# Patient Record
Sex: MALE | Race: BLACK OR AFRICAN AMERICAN | NOT HISPANIC OR LATINO | Employment: FULL TIME | URBAN - METROPOLITAN AREA
[De-identification: names, ages, dates, MRNs, and addresses within clinical notes are randomized per-mention and may not be internally consistent; named-entity substitution may affect disease eponyms.]

---

## 2020-07-07 ENCOUNTER — OFFICE VISIT (OUTPATIENT)
Dept: INTERNAL MEDICINE | Facility: CLINIC | Age: 45
End: 2020-07-07
Payer: COMMERCIAL

## 2020-07-07 ENCOUNTER — HOSPITAL ENCOUNTER (OUTPATIENT)
Dept: RADIOLOGY | Facility: HOSPITAL | Age: 45
Discharge: HOME | End: 2020-07-07
Attending: FAMILY MEDICINE
Payer: COMMERCIAL

## 2020-07-07 VITALS
HEIGHT: 71 IN | WEIGHT: 209.88 LBS | RESPIRATION RATE: 18 BRPM | BODY MASS INDEX: 29.38 KG/M2 | SYSTOLIC BLOOD PRESSURE: 116 MMHG | DIASTOLIC BLOOD PRESSURE: 80 MMHG | TEMPERATURE: 98 F | OXYGEN SATURATION: 98 % | HEART RATE: 78 BPM

## 2020-07-07 DIAGNOSIS — M25.551 ACUTE RIGHT HIP PAIN: Primary | ICD-10-CM

## 2020-07-07 DIAGNOSIS — M25.551 ACUTE RIGHT HIP PAIN: ICD-10-CM

## 2020-07-07 DIAGNOSIS — Z29.9 PREVENTIVE MEASURE: ICD-10-CM

## 2020-07-07 PROCEDURE — 99203 OFFICE O/P NEW LOW 30 MIN: CPT | Mod: S$GLB,,, | Performed by: FAMILY MEDICINE

## 2020-07-07 PROCEDURE — 99999 PR PBB SHADOW E&M-NEW PATIENT-LVL IV: CPT | Mod: PBBFAC,,, | Performed by: FAMILY MEDICINE

## 2020-07-07 PROCEDURE — 99999 PR PBB SHADOW E&M-NEW PATIENT-LVL IV: ICD-10-PCS | Mod: PBBFAC,,, | Performed by: FAMILY MEDICINE

## 2020-07-07 PROCEDURE — 99203 PR OFFICE/OUTPT VISIT, NEW, LEVL III, 30-44 MIN: ICD-10-PCS | Mod: S$GLB,,, | Performed by: FAMILY MEDICINE

## 2020-07-07 PROCEDURE — 73502 X-RAY EXAM HIP UNI 2-3 VIEWS: CPT | Mod: 26,RT,, | Performed by: RADIOLOGY

## 2020-07-07 PROCEDURE — 73502 XR HIP 2 VIEW RIGHT: ICD-10-PCS | Mod: 26,RT,, | Performed by: RADIOLOGY

## 2020-07-07 PROCEDURE — 73502 X-RAY EXAM HIP UNI 2-3 VIEWS: CPT | Mod: TC,RT

## 2020-07-07 RX ORDER — METHYLPREDNISOLONE 4 MG/1
TABLET ORAL
Qty: 1 PACKAGE | Refills: 0 | Status: SHIPPED | OUTPATIENT
Start: 2020-07-07 | End: 2020-08-11

## 2020-07-07 NOTE — PROGRESS NOTES
"Subjective:       Patient ID: Dedrick Brooks is a 45 y.o. male.    Chief Complaint: Hip Pain (Right hip started approx  2-3 days ago)    45-year-old  male patient new to my clinic with no significant past medical history reports that patient started to have right hip pain up to 6/10 for the past 2-3 days,  started to progressively get worse, denies any significant tingling or numbness sensation to extremities or low back pain  Denies any trouble with bowel movements or urine  No history of falls or injury reported  Patient has tried taking anti inflammatory 600 mg with some relief    Review of Systems   Constitutional: Negative for appetite change and fatigue.   Eyes: Negative for visual disturbance.   Respiratory: Negative for shortness of breath.    Cardiovascular: Negative for chest pain, palpitations and leg swelling.   Gastrointestinal: Negative for abdominal pain, nausea and vomiting.   Musculoskeletal: Positive for arthralgias. Negative for myalgias.   Skin: Negative for rash.   Neurological: Negative for weakness, numbness and headaches.   Psychiatric/Behavioral: Negative for sleep disturbance.         /80 (BP Location: Right arm, Patient Position: Sitting, BP Method: Large (Manual))   Pulse 78   Temp 97.9 °F (36.6 °C) (Tympanic)   Resp 18   Ht 5' 11" (1.803 m)   Wt 95.2 kg (209 lb 14.1 oz)   SpO2 98%   BMI 29.27 kg/m²   Objective:      Physical Exam  Constitutional:       Appearance: He is well-developed.   HENT:      Head: Normocephalic and atraumatic.   Cardiovascular:      Rate and Rhythm: Normal rate and regular rhythm.      Heart sounds: Normal heart sounds. No murmur.   Pulmonary:      Effort: Pulmonary effort is normal.      Breath sounds: Normal breath sounds. No wheezing.   Abdominal:      General: Bowel sounds are normal.      Palpations: Abdomen is soft.      Tenderness: There is no abdominal tenderness.   Musculoskeletal:         General: Tenderness present. No " swelling, deformity or signs of injury.      Right lower leg: No edema.      Left lower leg: No edema.      Comments: Positive for tenderness to the right hip with straight leg raise test positive.   No significant tenderness noted to the paraspinal lumbar muscles on palpation   Skin:     General: Skin is warm and dry.      Findings: No erythema or rash.   Neurological:      Mental Status: He is alert and oriented to person, place, and time.           Assessment/Plan:   1. Acute right hip pain  - X-Ray Hip 2 View Right; Future  - methylPREDNISolone (MEDROL DOSEPACK) 4 mg tablet; use as directed  Dispense: 1 Package; Refill: 0  Medrol Dosepak prescribed today and patient was advised to take over-the-counter Tylenol/ibuprofen as needed for pain  Graded exercise regimen recommended  Will get x-ray of the right hip to look into further etiology    2. Preventive measure  - Comprehensive metabolic panel; Future  - CBC auto differential; Future  - Lipid Panel; Future  - TSH; Future  - Urinalysis; Future  - HIV 1/2 Ag/Ab (4th Gen); Future  - Vitamin D; Future  Patient was advised to do labs prior to next visit in 1 month for physicals

## 2020-08-07 ENCOUNTER — LAB VISIT (OUTPATIENT)
Dept: LAB | Facility: HOSPITAL | Age: 45
End: 2020-08-07
Attending: FAMILY MEDICINE
Payer: COMMERCIAL

## 2020-08-07 DIAGNOSIS — Z29.9 PREVENTIVE MEASURE: ICD-10-CM

## 2020-08-07 LAB
BILIRUB UR QL STRIP: NEGATIVE
CLARITY UR: CLEAR
COLOR UR: YELLOW
GLUCOSE UR QL STRIP: NEGATIVE
HGB UR QL STRIP: NEGATIVE
KETONES UR QL STRIP: NEGATIVE
LEUKOCYTE ESTERASE UR QL STRIP: NEGATIVE
NITRITE UR QL STRIP: NEGATIVE
PH UR STRIP: 6 [PH] (ref 5–8)
PROT UR QL STRIP: NEGATIVE
SP GR UR STRIP: 1.01 (ref 1–1.03)
URN SPEC COLLECT METH UR: NORMAL

## 2020-08-07 PROCEDURE — 81003 URINALYSIS AUTO W/O SCOPE: CPT

## 2020-08-11 ENCOUNTER — LAB VISIT (OUTPATIENT)
Dept: LAB | Facility: HOSPITAL | Age: 45
End: 2020-08-11
Attending: FAMILY MEDICINE
Payer: COMMERCIAL

## 2020-08-11 ENCOUNTER — OFFICE VISIT (OUTPATIENT)
Dept: INTERNAL MEDICINE | Facility: CLINIC | Age: 45
End: 2020-08-11
Payer: COMMERCIAL

## 2020-08-11 VITALS
OXYGEN SATURATION: 97 % | DIASTOLIC BLOOD PRESSURE: 80 MMHG | HEART RATE: 73 BPM | WEIGHT: 212.31 LBS | SYSTOLIC BLOOD PRESSURE: 122 MMHG | BODY MASS INDEX: 29.61 KG/M2

## 2020-08-11 DIAGNOSIS — R25.1 TREMOR OF RIGHT HAND: ICD-10-CM

## 2020-08-11 DIAGNOSIS — Z23 NEED FOR DIPHTHERIA-TETANUS-PERTUSSIS (TDAP) VACCINE: ICD-10-CM

## 2020-08-11 DIAGNOSIS — R79.89 ELEVATED LIVER FUNCTION TESTS: ICD-10-CM

## 2020-08-11 DIAGNOSIS — Z72.0 TOBACCO ABUSE: ICD-10-CM

## 2020-08-11 DIAGNOSIS — Z00.00 ROUTINE GENERAL MEDICAL EXAMINATION AT A HEALTH CARE FACILITY: Primary | ICD-10-CM

## 2020-08-11 PROCEDURE — 99999 PR PBB SHADOW E&M-EST. PATIENT-LVL III: CPT | Mod: PBBFAC,,, | Performed by: FAMILY MEDICINE

## 2020-08-11 PROCEDURE — 82607 VITAMIN B-12: CPT

## 2020-08-11 PROCEDURE — 90471 IMMUNIZATION ADMIN: CPT | Mod: S$GLB,,, | Performed by: FAMILY MEDICINE

## 2020-08-11 PROCEDURE — 99396 PREV VISIT EST AGE 40-64: CPT | Mod: 25,S$GLB,, | Performed by: FAMILY MEDICINE

## 2020-08-11 PROCEDURE — 99999 PR PBB SHADOW E&M-EST. PATIENT-LVL III: ICD-10-PCS | Mod: PBBFAC,,, | Performed by: FAMILY MEDICINE

## 2020-08-11 PROCEDURE — 80074 ACUTE HEPATITIS PANEL: CPT

## 2020-08-11 PROCEDURE — 99396 PR PREVENTIVE VISIT,EST,40-64: ICD-10-PCS | Mod: 25,S$GLB,, | Performed by: FAMILY MEDICINE

## 2020-08-11 PROCEDURE — 80053 COMPREHEN METABOLIC PANEL: CPT

## 2020-08-11 PROCEDURE — 90715 TDAP VACCINE 7 YRS/> IM: CPT | Mod: S$GLB,,, | Performed by: FAMILY MEDICINE

## 2020-08-11 PROCEDURE — 90715 TDAP VACCINE GREATER THAN OR EQUAL TO 7YO IM: ICD-10-PCS | Mod: S$GLB,,, | Performed by: FAMILY MEDICINE

## 2020-08-11 PROCEDURE — 90471 TDAP VACCINE GREATER THAN OR EQUAL TO 7YO IM: ICD-10-PCS | Mod: S$GLB,,, | Performed by: FAMILY MEDICINE

## 2020-08-11 PROCEDURE — 36415 COLL VENOUS BLD VENIPUNCTURE: CPT

## 2020-08-11 NOTE — PROGRESS NOTES
Subjective:       Patient ID: Dedrick Brooks is a 45 y.o. male.    Chief Complaint: Follow-up ( 1 month)    45-year-old  male patient with no significant past medical history here to discuss about recent test results and for routine annual physicals.  Reports that he continues to have tremor to the right hand occasionally, denies any hip pain.   Patient reports that he drank a diaqueri 1 day before doing blood work  Denies any chest pain or shortness of breath, palpitations, abdominal discomfort nausea vomiting    Review of Systems   Constitutional: Negative for appetite change and fatigue.   Eyes: Negative for visual disturbance.   Respiratory: Negative for shortness of breath.    Cardiovascular: Negative for chest pain, palpitations and leg swelling.   Gastrointestinal: Negative for abdominal pain, nausea and vomiting.   Musculoskeletal: Negative for myalgias.   Skin: Negative for rash.   Neurological: Positive for tremors. Negative for headaches.   Psychiatric/Behavioral: Negative for sleep disturbance.         /80 (BP Location: Right arm, Patient Position: Sitting, BP Method: Large (Manual))   Pulse 73   Wt 96.3 kg (212 lb 4.9 oz)   SpO2 97%   BMI 29.61 kg/m²   Objective:      Physical Exam  Constitutional:       Appearance: He is well-developed.   HENT:      Head: Normocephalic and atraumatic.   Cardiovascular:      Rate and Rhythm: Normal rate and regular rhythm.      Heart sounds: Normal heart sounds. No murmur.   Pulmonary:      Effort: Pulmonary effort is normal.      Breath sounds: Normal breath sounds. No wheezing.   Abdominal:      General: Bowel sounds are normal.      Palpations: Abdomen is soft.      Tenderness: There is no abdominal tenderness.   Skin:     General: Skin is warm and dry.      Findings: No rash.   Neurological:      Mental Status: He is alert and oriented to person, place, and time.      Comments: Noted tremor to right hand on hyper extension   Psychiatric:          Mood and Affect: Mood normal.         Lab Visit on 08/07/2020   Component Date Value Ref Range Status    Sodium 08/07/2020 139  136 - 145 mmol/L Final    Potassium 08/07/2020 4.1  3.5 - 5.1 mmol/L Final    Chloride 08/07/2020 106  95 - 110 mmol/L Final    CO2 08/07/2020 26  23 - 29 mmol/L Final    Glucose 08/07/2020 91  70 - 110 mg/dL Final    BUN, Bld 08/07/2020 12  6 - 20 mg/dL Final    Creatinine 08/07/2020 1.5* 0.5 - 1.4 mg/dL Final    Calcium 08/07/2020 9.0  8.7 - 10.5 mg/dL Final    Total Protein 08/07/2020 7.1  6.0 - 8.4 g/dL Final    Albumin 08/07/2020 4.0  3.5 - 5.2 g/dL Final    Total Bilirubin 08/07/2020 1.1* 0.1 - 1.0 mg/dL Final    Comment: For infants and newborns, interpretation of results should be based  on gestational age, weight and in agreement with clinical  observations.  Premature Infant recommended reference ranges:  Up to 24 hours.............<8.0 mg/dL  Up to 48 hours............<12.0 mg/dL  3-5 days..................<15.0 mg/dL  6-29 days.................<15.0 mg/dL      Alkaline Phosphatase 08/07/2020 46* 55 - 135 U/L Final    AST 08/07/2020 127* 10 - 40 U/L Final    ALT 08/07/2020 53* 10 - 44 U/L Final    Anion Gap 08/07/2020 7* 8 - 16 mmol/L Final    eGFR if African American 08/07/2020 >60.0  >60 mL/min/1.73 m^2 Final    eGFR if non African American 08/07/2020 55.4* >60 mL/min/1.73 m^2 Final    Comment: Calculation used to obtain the estimated glomerular filtration  rate (eGFR) is the CKD-EPI equation.       WBC 08/07/2020 3.88* 3.90 - 12.70 K/uL Final    RBC 08/07/2020 4.92  4.60 - 6.20 M/uL Final    Hemoglobin 08/07/2020 16.1  14.0 - 18.0 g/dL Final    Hematocrit 08/07/2020 48.1  40.0 - 54.0 % Final    Mean Corpuscular Volume 08/07/2020 98  82 - 98 fL Final    Mean Corpuscular Hemoglobin 08/07/2020 32.7* 27.0 - 31.0 pg Final    Mean Corpuscular Hemoglobin Conc 08/07/2020 33.5  32.0 - 36.0 g/dL Final    RDW 08/07/2020 13.5  11.5 - 14.5 % Final     Platelets 08/07/2020 214  150 - 350 K/uL Final    MPV 08/07/2020 10.6  9.2 - 12.9 fL Final    Immature Granulocytes 08/07/2020 0.3  0.0 - 0.5 % Final    Gran # (ANC) 08/07/2020 1.9  1.8 - 7.7 K/uL Final    Immature Grans (Abs) 08/07/2020 0.01  0.00 - 0.04 K/uL Final    Comment: Mild elevation in immature granulocytes is non specific and   can be seen in a variety of conditions including stress response,   acute inflammation, trauma and pregnancy. Correlation with other   laboratory and clinical findings is essential.      Lymph # 08/07/2020 1.4  1.0 - 4.8 K/uL Final    Mono # 08/07/2020 0.5  0.3 - 1.0 K/uL Final    Eos # 08/07/2020 0.1  0.0 - 0.5 K/uL Final    Baso # 08/07/2020 0.02  0.00 - 0.20 K/uL Final    nRBC 08/07/2020 0  0 /100 WBC Final    Gran% 08/07/2020 47.8  38.0 - 73.0 % Final    Lymph% 08/07/2020 35.1  18.0 - 48.0 % Final    Mono% 08/07/2020 13.7  4.0 - 15.0 % Final    Eosinophil% 08/07/2020 2.6  0.0 - 8.0 % Final    Basophil% 08/07/2020 0.5  0.0 - 1.9 % Final    Differential Method 08/07/2020 Automated   Final    Cholesterol 08/07/2020 196  120 - 199 mg/dL Final    Comment: The National Cholesterol Education Program (NCEP) has set the  following guidelines (reference ranges) for Cholesterol:  Optimal.....................<200 mg/dL  Borderline High.............200-239 mg/dL  High........................> or = 240 mg/dL      Triglycerides 08/07/2020 96  30 - 150 mg/dL Final    Comment: The National Cholesterol Education Program (NCEP) has set the  following guidelines (reference values) for triglycerides:  Normal......................<150 mg/dL  Borderline High.............150-199 mg/dL  High........................200-499 mg/dL      HDL 08/07/2020 45  40 - 75 mg/dL Final    Comment: The National Cholesterol Education Program (NCEP) has set the  following guidelines (reference values) for HDL Cholesterol:  Low...............<40 mg/dL  Optimal...........>60 mg/dL      LDL Cholesterol  08/07/2020 131.8  63.0 - 159.0 mg/dL Final    Comment: The National Cholesterol Education Program (NCEP) has set the  following guidelines (reference values) for LDL Cholesterol:  Optimal.......................<130 mg/dL  Borderline High...............130-159 mg/dL  High..........................160-189 mg/dL  Very High.....................>190 mg/dL      Hdl/Cholesterol Ratio 08/07/2020 23.0  20.0 - 50.0 % Final    Total Cholesterol/HDL Ratio 08/07/2020 4.4  2.0 - 5.0 Final    Non-HDL Cholesterol 08/07/2020 151  mg/dL Final    Comment: Risk category and Non-HDL cholesterol goals:  Coronary heart disease (CHD)or equivalent (10-year risk of CHD >20%):  Non-HDL cholesterol goal     <130 mg/dL  Two or more CHD risk factors and 10-year risk of CHD <= 20%:  Non-HDL cholesterol goal     <160 mg/dL  0 to 1 CHD risk factor:  Non-HDL cholesterol goal     <190 mg/dL      TSH 08/07/2020 1.677  0.400 - 4.000 uIU/mL Final    HIV 1/2 Ag/Ab 08/07/2020 Negative  Negative Final    Vit D, 25-Hydroxy 08/07/2020 26* 30 - 96 ng/mL Final    Comment: Vitamin D deficiency.........<10 ng/mL                              Vitamin D insufficiency......10-29 ng/mL       Vitamin D sufficiency........> or equal to 30 ng/mL  Vitamin D toxicity............>100 ng/mL     Lab Visit on 08/07/2020   Component Date Value Ref Range Status    Specimen UA 08/07/2020 Urine, Clean Catch   Final    Color, UA 08/07/2020 Yellow  Yellow, Straw, [place] Final    Appearance, UA 08/07/2020 Clear  Clear Final    pH, UA 08/07/2020 6.0  5.0 - 8.0 Final    Specific [place], UA 08/07/2020 1.015  1.005 - 1.030 Final    Protein, UA 08/07/2020 Negative  Negative Final    Comment: Recommend a 24 hour urine protein or a urine   protein/creatinine ratio if globulin induced proteinuria is  clinically suspected.      Glucose, UA 08/07/2020 Negative  Negative Final    Ketones, UA 08/07/2020 Negative  Negative Final    Bilirubin (UA) 08/07/2020 Negative  Negative  Final    Occult Blood UA 08/07/2020 Negative  Negative Final    Nitrite, UA 08/07/2020 Negative  Negative Final    Leukocytes, UA 08/07/2020 Negative  Negative Final       Assessment/Plan:   1. Routine general medical examination at a health care facility  Vital signs stable today.  Clinical exam stable  Continue lifestyle modifications with low-fat and low-cholesterol diet and exercise 30 min daily  Tetanus booster given today  Encouraged to quit smoking    2. Elevated liver function tests  - Comprehensive metabolic panel; Future  - HEPATITIS PANEL, ACUTE; Future  Reviewed recent labs showing mildly elevated liver functions  Will plan to repeat liver enzymes including acute hepatitis panel    Also noted elevated creatinine, patient was advised to hold off taking protein supplements over-the-counter  Drink adequate fluids    3. Tobacco abuse  Encouraged to quit smoking    4. Need for diphtheria-tetanus-pertussis (Tdap) vaccine  - (In Office Administered) Tdap Vaccine  Tetanus booster given today    5. Tremor of right hand  - Vitamin B12; Future  - Ambulatory referral/consult to Neurology; Future   Will refer to Urology secondary to consistent tremor to the right hand, worse when trying to reach an object  Normal thyroid levels

## 2020-08-12 DIAGNOSIS — R79.89 ELEVATED LIVER FUNCTION TESTS: Primary | ICD-10-CM

## 2020-08-12 LAB
ALBUMIN SERPL BCP-MCNC: 4.2 G/DL (ref 3.5–5.2)
ALP SERPL-CCNC: 51 U/L (ref 55–135)
ALT SERPL W/O P-5'-P-CCNC: 155 U/L (ref 10–44)
ANION GAP SERPL CALC-SCNC: 10 MMOL/L (ref 8–16)
AST SERPL-CCNC: 220 U/L (ref 10–40)
BILIRUB SERPL-MCNC: 1 MG/DL (ref 0.1–1)
BUN SERPL-MCNC: 14 MG/DL (ref 6–20)
CALCIUM SERPL-MCNC: 9.1 MG/DL (ref 8.7–10.5)
CHLORIDE SERPL-SCNC: 105 MMOL/L (ref 95–110)
CO2 SERPL-SCNC: 25 MMOL/L (ref 23–29)
CREAT SERPL-MCNC: 1.3 MG/DL (ref 0.5–1.4)
EST. GFR  (AFRICAN AMERICAN): >60 ML/MIN/1.73 M^2
EST. GFR  (NON AFRICAN AMERICAN): >60 ML/MIN/1.73 M^2
GLUCOSE SERPL-MCNC: 82 MG/DL (ref 70–110)
HAV IGM SERPL QL IA: NEGATIVE
HBV CORE IGM SERPL QL IA: NEGATIVE
HBV SURFACE AG SERPL QL IA: NEGATIVE
HCV AB SERPL QL IA: NEGATIVE
POTASSIUM SERPL-SCNC: 4.5 MMOL/L (ref 3.5–5.1)
PROT SERPL-MCNC: 7.2 G/DL (ref 6–8.4)
SODIUM SERPL-SCNC: 140 MMOL/L (ref 136–145)
VIT B12 SERPL-MCNC: 536 PG/ML (ref 210–950)

## 2020-08-13 ENCOUNTER — HOSPITAL ENCOUNTER (OUTPATIENT)
Dept: RADIOLOGY | Facility: HOSPITAL | Age: 45
Discharge: HOME | End: 2020-08-13
Attending: FAMILY MEDICINE
Payer: COMMERCIAL

## 2020-08-13 ENCOUNTER — TELEPHONE (OUTPATIENT)
Dept: INTERNAL MEDICINE | Facility: CLINIC | Age: 45
End: 2020-08-13

## 2020-08-13 DIAGNOSIS — R93.2 ABNORMAL LIVER DIAGNOSTIC IMAGING: Primary | ICD-10-CM

## 2020-08-13 DIAGNOSIS — R79.89 ELEVATED LIVER FUNCTION TESTS: ICD-10-CM

## 2020-08-13 PROCEDURE — 76700 US EXAM ABDOM COMPLETE: CPT | Mod: 26,,, | Performed by: RADIOLOGY

## 2020-08-13 PROCEDURE — 76700 US EXAM ABDOM COMPLETE: CPT | Mod: TC

## 2020-08-13 PROCEDURE — 76700 US ABDOMEN COMPLETE: ICD-10-PCS | Mod: 26,,, | Performed by: RADIOLOGY

## 2020-08-13 NOTE — TELEPHONE ENCOUNTER
----- Message from Adryan Terrell sent at 8/13/2020 11:02 AM CDT -----  Contact: self  Type:  Patient Returning Call    Who Called:pt  Who Left Message for Patient:nurse  Does the patient know what this is regarding?:no  Would the patient rather a call back or a response via [facility]? Call back  Best Call Back Number:[phone]  Additional Information: n/a

## 2020-09-28 ENCOUNTER — TELEPHONE (OUTPATIENT)
Dept: GASTROENTEROLOGY | Facility: CLINIC | Age: 45
End: 2020-09-28

## 2020-09-28 NOTE — TELEPHONE ENCOUNTER
----- Message from Meron Lopez sent at 9/28/2020 12:45 PM CDT -----  Regarding: reschuduling his appt  Contact: pt  Caller is requesting a call back regarding rescheduling his appt.  Please call back at [phone] (home).  Thanks.

## 2020-10-13 ENCOUNTER — OFFICE VISIT (OUTPATIENT)
Dept: GASTROENTEROLOGY | Facility: CLINIC | Age: 45
End: 2020-10-13
Payer: COMMERCIAL

## 2020-10-13 ENCOUNTER — LAB VISIT (OUTPATIENT)
Dept: LAB | Facility: HOSPITAL | Age: 45
End: 2020-10-13
Attending: NURSE PRACTITIONER
Payer: COMMERCIAL

## 2020-10-13 ENCOUNTER — PATIENT OUTREACH (OUTPATIENT)
Dept: ADMINISTRATIVE | Facility: OTHER | Age: 45
End: 2020-10-13

## 2020-10-13 VITALS
SYSTOLIC BLOOD PRESSURE: 124 MMHG | BODY MASS INDEX: 30 KG/M2 | HEART RATE: 66 BPM | HEIGHT: 71 IN | WEIGHT: 214.31 LBS | DIASTOLIC BLOOD PRESSURE: 72 MMHG

## 2020-10-13 DIAGNOSIS — R74.8 ELEVATED LIVER ENZYMES: Primary | ICD-10-CM

## 2020-10-13 DIAGNOSIS — K76.9 LIVER LESION: ICD-10-CM

## 2020-10-13 DIAGNOSIS — R74.8 ELEVATED LIVER ENZYMES: ICD-10-CM

## 2020-10-13 LAB
ALBUMIN SERPL BCP-MCNC: 3.7 G/DL (ref 3.5–5.2)
ALP SERPL-CCNC: 48 U/L (ref 55–135)
ALT SERPL W/O P-5'-P-CCNC: 28 U/L (ref 10–44)
ANION GAP SERPL CALC-SCNC: 7 MMOL/L (ref 8–16)
AST SERPL-CCNC: 28 U/L (ref 10–40)
BASOPHILS # BLD AUTO: 0.01 K/UL (ref 0–0.2)
BASOPHILS NFR BLD: 0.3 % (ref 0–1.9)
BILIRUB DIRECT SERPL-MCNC: 0.3 MG/DL (ref 0.1–0.3)
BILIRUB SERPL-MCNC: 0.9 MG/DL (ref 0.1–1)
BUN SERPL-MCNC: 11 MG/DL (ref 6–20)
CALCIUM SERPL-MCNC: 8.8 MG/DL (ref 8.7–10.5)
CHLORIDE SERPL-SCNC: 108 MMOL/L (ref 95–110)
CO2 SERPL-SCNC: 28 MMOL/L (ref 23–29)
CREAT SERPL-MCNC: 1.3 MG/DL (ref 0.5–1.4)
DIFFERENTIAL METHOD: ABNORMAL
EOSINOPHIL # BLD AUTO: 0.1 K/UL (ref 0–0.5)
EOSINOPHIL NFR BLD: 3.6 % (ref 0–8)
ERYTHROCYTE [DISTWIDTH] IN BLOOD BY AUTOMATED COUNT: 13.5 % (ref 11.5–14.5)
EST. GFR  (AFRICAN AMERICAN): >60 ML/MIN/1.73 M^2
EST. GFR  (NON AFRICAN AMERICAN): >60 ML/MIN/1.73 M^2
GLUCOSE SERPL-MCNC: 96 MG/DL (ref 70–110)
HCT VFR BLD AUTO: 45.6 % (ref 40–54)
HGB BLD-MCNC: 15.4 G/DL (ref 14–18)
IMM GRANULOCYTES # BLD AUTO: 0 K/UL (ref 0–0.04)
IMM GRANULOCYTES NFR BLD AUTO: 0 % (ref 0–0.5)
INR PPP: 1 (ref 0.8–1.2)
LYMPHOCYTES # BLD AUTO: 1.3 K/UL (ref 1–4.8)
LYMPHOCYTES NFR BLD: 41.2 % (ref 18–48)
MCH RBC QN AUTO: 33 PG (ref 27–31)
MCHC RBC AUTO-ENTMCNC: 33.8 G/DL (ref 32–36)
MCV RBC AUTO: 98 FL (ref 82–98)
MONOCYTES # BLD AUTO: 0.5 K/UL (ref 0.3–1)
MONOCYTES NFR BLD: 15.7 % (ref 4–15)
NEUTROPHILS # BLD AUTO: 1.2 K/UL (ref 1.8–7.7)
NEUTROPHILS NFR BLD: 39.2 % (ref 38–73)
NRBC BLD-RTO: 0 /100 WBC
PLATELET # BLD AUTO: 196 K/UL (ref 150–350)
PMV BLD AUTO: 11 FL (ref 9.2–12.9)
POTASSIUM SERPL-SCNC: 4.6 MMOL/L (ref 3.5–5.1)
PROT SERPL-MCNC: 6.6 G/DL (ref 6–8.4)
PROTHROMBIN TIME: 10.9 SEC (ref 9–12.5)
RBC # BLD AUTO: 4.66 M/UL (ref 4.6–6.2)
SODIUM SERPL-SCNC: 143 MMOL/L (ref 136–145)
WBC # BLD AUTO: 3.06 K/UL (ref 3.9–12.7)

## 2020-10-13 PROCEDURE — 36415 COLL VENOUS BLD VENIPUNCTURE: CPT

## 2020-10-13 PROCEDURE — 99999 PR PBB SHADOW E&M-EST. PATIENT-LVL III: CPT | Mod: PBBFAC,,, | Performed by: NURSE PRACTITIONER

## 2020-10-13 PROCEDURE — 99204 PR OFFICE/OUTPT VISIT, NEW, LEVL IV, 45-59 MIN: ICD-10-PCS | Mod: S$GLB,,, | Performed by: NURSE PRACTITIONER

## 2020-10-13 PROCEDURE — 80048 BASIC METABOLIC PNL TOTAL CA: CPT

## 2020-10-13 PROCEDURE — 80076 HEPATIC FUNCTION PANEL: CPT

## 2020-10-13 PROCEDURE — 99204 OFFICE O/P NEW MOD 45 MIN: CPT | Mod: S$GLB,,, | Performed by: NURSE PRACTITIONER

## 2020-10-13 PROCEDURE — 85610 PROTHROMBIN TIME: CPT

## 2020-10-13 PROCEDURE — 99999 PR PBB SHADOW E&M-EST. PATIENT-LVL III: ICD-10-PCS | Mod: PBBFAC,,, | Performed by: NURSE PRACTITIONER

## 2020-10-13 PROCEDURE — 85025 COMPLETE CBC W/AUTO DIFF WBC: CPT

## 2020-10-13 NOTE — LETTER
October 13, 2020      Yadira Mccauley MD  [address]  Doctor's Office [place], [address]  [facility] [address]           [facility]  [address]  [place] [address]  Phone: [phone]  Fax: [phone]          Patient: Dedrick Brooks   MR Number: [id number]   YOB: 1975   Date of Visit: 10/13/2020       Dear Dr. Yadira Mccauley:    Thank you for referring Dedrick Brooks to me for evaluation. Attached you will find relevant portions of my assessment and plan of care.    If you have questions, please do not hesitate to call me. I look forward to following Dedrick Brooks along with you.    Sincerely,    Saray Amos, [facility]    Enclosure  CC:  No Recipients    If you would like to receive this communication electronically, please contact [email or web address] or [phone] to request more information on [email or web address] Link access.    For providers and/or their staff who would like to refer a patient to [facility], please contact us through our one-stop-shop provider referral line, [facility], at [phone].    If you feel you have received this communication in error or would no longer like to receive these types of communications, please e-mail [email or web address]

## 2020-10-13 NOTE — PROGRESS NOTES
Clinic Consult:  [facility] Consultation Note    Reason for Consult:  The primary encounter diagnosis was Elevated liver enzymes. A diagnosis of Liver lesion was also pertinent to this visit.    PCP: Salud Mccauley   [address] [facility] Doctor's Office [place], [address]th [address] / [place]    HPI:  This is a 45 y.o. male here for evaluation of the above  Pt states that he was recently seen by PCP for wellness visit.  At that time, labs were completed and he was found to have significantly elevated LFTs.   He denies any previously known liver disease.   Admits to frequent ETOH intake.  Also was taking a pre-workout and protein supplement just prior to those labs being completed.  He has since stopped the supplements.  Has decreased ETOH intake as well.   Further workup included acute hepatitis panel which was negative. US showed multiple lesions in the liver, likely hemangioma.   Pt states that he has been feeling overall well without any GI complaints  Denies any abdominal pain.  No nausea or vomiting.  No change in bowel pattern.  No melena or hematochezia. No weight loss.  No upper GI bleeding.  No ascites or BLE.  No overt confusion.      Review of Systems   Constitutional: Negative for chills, fever, malaise/fatigue and weight loss.   Respiratory: Negative for cough.    Cardiovascular: Negative for chest pain.   Gastrointestinal:        Per HPI   Musculoskeletal: Negative for myalgias.   Skin: Negative for itching and rash.   Neurological: Negative for headaches.   Psychiatric/Behavioral: The patient is not nervous/anxious.        Medical History:   History reviewed. No pertinent past medical history.    Surgical History:  History reviewed. No pertinent surgical history.    Family History:   Family History   Problem Relation Age of Onset    No Known Problems Mother     No Known Problems Father        Social History:   Social History     Tobacco Use    Smoking status: Current Some Day Smoker     Types: Cigars  "   Smokeless tobacco: Never Used   Substance Use Topics    Alcohol use: Yes     Comment: occasionally     Drug use: Never       Allergies: Reviewed    Home Medications:   No current outpatient medications on file prior to visit.     No current facility-administered medications on file prior to visit.        Physical Exam:  Vital Signs:  /72   Pulse 66   Ht 5' 11" (1.803 m)   Wt 97.2 kg (214 lb 4.6 oz)   BMI 29.89 kg/m²   Body mass index is 29.89 kg/m².  Physical Exam  Vitals signs reviewed.   Constitutional:       Appearance: He is well-developed.   HENT:      Head: Normocephalic.   Eyes:      General: No scleral icterus.  Neck:      Musculoskeletal: Normal range of motion.   Cardiovascular:      Rate and Rhythm: Normal rate.   Pulmonary:      Effort: Pulmonary effort is normal.   Abdominal:      General: There is no distension.      Palpations: Abdomen is soft.   Musculoskeletal: Normal range of motion.   Skin:     General: Skin is dry.   Neurological:      Mental Status: He is alert and oriented to person, place, and time.         Labs: Pertinent labs reviewed.    Assessment:  1. Elevated liver enzymes    2. Liver lesion         Recommendations:  Labs reviewed in detail  I suspect the elevation is related to supplement use and increased ETOH  - urged decreased ETOH intake.  Pt states understanding  - continue to hold all supplements for now.  Discussed risk of pre-workout supplements.   - will repeat labs today.  If remain elevated, will need additional labs to R/O other etiologies such as autoimmune and iron overload  - will plan for CT TRIPLE phase for further evaluation of the lesions seen on US.       Follow up to be determined by results of above.        Thank you so much for allowing me to participate in the care of HAL Mclaughlin    "

## 2020-10-23 ENCOUNTER — TELEPHONE (OUTPATIENT)
Dept: RADIOLOGY | Facility: HOSPITAL | Age: 45
End: 2020-10-23

## 2020-10-26 ENCOUNTER — HOSPITAL ENCOUNTER (OUTPATIENT)
Dept: RADIOLOGY | Facility: HOSPITAL | Age: 45
Discharge: HOME | End: 2020-10-26
Attending: NURSE PRACTITIONER
Payer: COMMERCIAL

## 2020-10-26 DIAGNOSIS — R74.8 ELEVATED LIVER ENZYMES: ICD-10-CM

## 2020-10-26 DIAGNOSIS — K76.9 LIVER LESION: ICD-10-CM

## 2020-10-26 PROCEDURE — 74178 CT ABDOMEN PELVIS W WO CONTRAST: ICD-10-PCS | Mod: 26,,, | Performed by: RADIOLOGY

## 2020-10-26 PROCEDURE — 74178 CT ABD&PLV WO CNTR FLWD CNTR: CPT | Mod: TC

## 2020-10-26 PROCEDURE — 74178 CT ABD&PLV WO CNTR FLWD CNTR: CPT | Mod: 26,,, | Performed by: RADIOLOGY

## 2020-10-26 PROCEDURE — 25500020 PHARM REV CODE 255: Performed by: NURSE PRACTITIONER

## 2020-10-26 RX ADMIN — IOHEXOL 100 ML: 350 INJECTION, SOLUTION INTRAVENOUS at 09:10

## 2020-11-13 DIAGNOSIS — K76.9 LIVER DISEASE, UNSPECIFIED: ICD-10-CM

## 2020-11-16 ENCOUNTER — TELEPHONE (OUTPATIENT)
Dept: GASTROENTEROLOGY | Facility: CLINIC | Age: 45
End: 2020-11-16

## 2020-11-16 NOTE — TELEPHONE ENCOUNTER
Returned call to patient. Gave him his test results and he asked if I could schedule the MRI appointment and just call him. He informed me that he prefers morning appointments on any day. I advised understanding.

## 2020-11-16 NOTE — TELEPHONE ENCOUNTER
----- Message from Lynette Peace sent at 11/16/2020 10:04 AM CST -----  Contact: Pt  .Type:  Patient Returning Call    Who Called: Dedrick  Who Left Message for Patient: Sejal  Does the patient know what this is regarding?: missed call  Would the patient rather a call back or a response via [facility]?  Call back  Best Call Back Number:  [phone] ([place])     Additional Information:

## 2020-11-16 NOTE — TELEPHONE ENCOUNTER
----- Message from Jen Walker sent at 11/16/2020  9:20 AM CST -----  Regarding: returmned call  Contact: [employer]  Patient is returning a call, please call them back at  [phone]

## 2020-12-04 ENCOUNTER — OFFICE VISIT (OUTPATIENT)
Dept: INTERNAL MEDICINE | Facility: CLINIC | Age: 45
End: 2020-12-04
Payer: COMMERCIAL

## 2020-12-04 VITALS
BODY MASS INDEX: 29.58 KG/M2 | WEIGHT: 212.06 LBS | SYSTOLIC BLOOD PRESSURE: 116 MMHG | HEART RATE: 60 BPM | DIASTOLIC BLOOD PRESSURE: 82 MMHG | OXYGEN SATURATION: 97 % | TEMPERATURE: 97 F

## 2020-12-04 DIAGNOSIS — Z23 NEED FOR INFLUENZA VACCINATION: ICD-10-CM

## 2020-12-04 DIAGNOSIS — S66.912A STRAIN OF LEFT WRIST, INITIAL ENCOUNTER: Primary | ICD-10-CM

## 2020-12-04 PROCEDURE — 99214 OFFICE O/P EST MOD 30 MIN: CPT | Mod: 25,S$GLB,, | Performed by: INTERNAL MEDICINE

## 2020-12-04 PROCEDURE — 90471 IMMUNIZATION ADMIN: CPT | Mod: S$GLB,,, | Performed by: INTERNAL MEDICINE

## 2020-12-04 PROCEDURE — 90471 FLU VACCINE (QUAD) GREATER THAN OR EQUAL TO 3YO PRESERVATIVE FREE IM: ICD-10-PCS | Mod: S$GLB,,, | Performed by: INTERNAL MEDICINE

## 2020-12-04 PROCEDURE — 99999 PR PBB SHADOW E&M-EST. PATIENT-LVL III: CPT | Mod: PBBFAC,,, | Performed by: INTERNAL MEDICINE

## 2020-12-04 PROCEDURE — 99214 PR OFFICE/OUTPT VISIT, EST, LEVL IV, 30-39 MIN: ICD-10-PCS | Mod: 25,S$GLB,, | Performed by: INTERNAL MEDICINE

## 2020-12-04 PROCEDURE — 90686 IIV4 VACC NO PRSV 0.5 ML IM: CPT | Mod: S$GLB,,, | Performed by: INTERNAL MEDICINE

## 2020-12-04 PROCEDURE — 99999 PR PBB SHADOW E&M-EST. PATIENT-LVL III: ICD-10-PCS | Mod: PBBFAC,,, | Performed by: INTERNAL MEDICINE

## 2020-12-04 PROCEDURE — 90686 FLU VACCINE (QUAD) GREATER THAN OR EQUAL TO 3YO PRESERVATIVE FREE IM: ICD-10-PCS | Mod: S$GLB,,, | Performed by: INTERNAL MEDICINE

## 2020-12-04 RX ORDER — NAPROXEN 500 MG/1
500 TABLET ORAL 2 TIMES DAILY WITH MEALS
Qty: 30 TABLET | Refills: 0 | Status: SHIPPED | OUTPATIENT
Start: 2020-12-04 | End: 2021-01-13

## 2020-12-04 NOTE — PATIENT INSTRUCTIONS
Wrist Sprain  A sprain is an injury to the ligaments or capsule that holds a joint together. There are no broken bones. Most sprains take about 3 to 6 weeks to heal. If it a severe sprain where the ligament is completely torn, it can take months to recover.     Most wrist sprains are treated with a splint, wrist brace, or elastic wrap for support. Severe sprains may require surgery.  Home care  · Keep your arm elevated to reduce pain and swelling. This is very important during the first 48 hours.  · Apply an ice pack over the injured area for 15 to 20 minutes every 3 to 6 hours. You should do this for the first 24 to 48 hours. You can make an ice pack by filling a plastic bag that seals at the top with ice cubes and then wrapping it with a thin towel. Continue to use ice packs for relief of pain and swelling as needed. As the ice melts, be careful to avoid getting your wrap, splint, or cast wet. After 48 hours, apply heat (warm shower or warm bath) for 15 to 20 minutes several times a day, or alternate ice and heat.   · You may use over-the-counter pain medicine to control pain, unless another pain medicine was prescribed. If you have chronic liver or kidney disease or ever had a stomach ulcer or GI bleeding, talk with your doctor before using these medicines.  · If you were given a splint or brace, wear it for the time advised by your doctor.  Follow-up care  Follow up with your healthcare provider as advised. Any X-rays you had today dont show any broken bones, breaks, or fractures. Sometimes fractures dont show up on the first X-ray. Bruises and sprains can sometimes hurt as much as a fracture. These injuries can take time to heal completely. If your symptoms dont improve or they get worse, talk with your doctor. You may need a repeat X-ray. If X-rays were taken, you will be told of any new findings that may affect your care.  When to seek medical advice  Call your healthcare provider right away if any of  these occur:  · Pain or swelling increases  · Fingers or hand becomes cold, blue, numb, or tingly  Date Last Reviewed: 11/20/2015  © 1430-1019 [employer]. [address] [place] [address], [place] [address]. All rights reserved. This information is not intended as a substitute for professional medical care. Always follow your healthcare professional's instructions.

## 2020-12-04 NOTE — PROGRESS NOTES
Subjective:      Patient ID: Dedrick Brooks is a 45 y.o. male.    Chief Complaint: Wrist Pain    Wrist Pain   The pain is present in the left wrist. This is a new problem. The current episode started in the past 7 days. The problem occurs daily. The problem has been waxing and waning. The quality of the pain is described as aching. The pain is moderate. Pertinent negatives include no fever, inability to bear weight, itching, joint locking, joint swelling, limited range of motion, numbness, stiffness or tingling. The symptoms are aggravated by activity (Supination more than pronation.). He has tried nothing for the symptoms. The treatment provided no relief.        46 yo with There is no problem list on file for this patient.    History reviewed. No pertinent past medical history.    Here today c/o wrist pain. No trauma.     Review of Systems   Constitutional: Negative for chills and fever.   HENT: Negative for ear pain and sore throat.    Respiratory: Negative for cough.    Cardiovascular: Negative for chest pain.   Gastrointestinal: Negative for abdominal pain and blood in stool.   Genitourinary: Negative for dysuria and hematuria.   Musculoskeletal: Negative for stiffness.   Skin: Negative for itching.   Neurological: Negative for tingling, seizures, syncope and numbness.     Objective:   /82 (BP Location: Left arm, Patient Position: Sitting, BP Method: Large (Manual))   Pulse 60   Temp 97.1 °F (36.2 °C) (Tympanic)   Wt 96.2 kg (212 lb 1.3 oz)   SpO2 97%   BMI 29.58 kg/m²     Physical Exam  Constitutional:       General: He is awake.      Appearance: Normal appearance.   HENT:      Head: Normocephalic and atraumatic.   Eyes:      Conjunctiva/sclera: Conjunctivae normal.   Neck:      Musculoskeletal: Normal range of motion.   Pulmonary:      Effort: Pulmonary effort is normal.   Musculoskeletal:      Left wrist: He exhibits normal range of motion, no tenderness and no bony tenderness.         Arms:    Neurological:      Mental Status: He is alert. Mental status is at baseline.   Psychiatric:         Mood and Affect: Mood normal.         Behavior: Behavior normal. Behavior is cooperative.         Thought Content: Thought content normal.         Judgment: Judgment normal.         Assessment:     1. Strain of left wrist, initial encounter    2. Need for influenza vaccination      Plan:   Strain of left wrist, initial encounter  -     naproxen (NAPROSYN) 500 MG tablet; Take 1 tablet (500 mg total) by mouth 2 (two) times daily with meals. For pain and inflammation  Dispense: 30 tablet; Refill: 0    Need for influenza vaccination  -     Cancel: Influenza - Quadrivalent *Preferred* (6 months+) (PF)    Other orders  -     Influenza - Quadrivalent (PF)        Lab Frequency Next Occurrence   Ambulatory referral/consult to Neurology Once 08/18/2020   Ambulatory referral/consult to Gastroenterology Once 08/20/2020   MRI Abdomen W WO Contrast Once 05/13/2021       Problem List Items Addressed This Visit     None      Visit Diagnoses     Strain of left wrist, initial encounter    -  Primary    Relevant Medications    naproxen (NAPROSYN) 500 MG tablet    Need for influenza vaccination              Follow up if symptoms worsen or fail to improve.

## 2021-01-06 ENCOUNTER — TELEPHONE (OUTPATIENT)
Dept: INTERNAL MEDICINE | Facility: CLINIC | Age: 46
End: 2021-01-06

## 2021-01-13 ENCOUNTER — OFFICE VISIT (OUTPATIENT)
Dept: INTERNAL MEDICINE | Facility: CLINIC | Age: 46
End: 2021-01-13
Payer: COMMERCIAL

## 2021-01-13 ENCOUNTER — HOSPITAL ENCOUNTER (OUTPATIENT)
Dept: RADIOLOGY | Facility: HOSPITAL | Age: 46
Discharge: HOME | End: 2021-01-13
Attending: FAMILY MEDICINE
Payer: COMMERCIAL

## 2021-01-13 ENCOUNTER — TELEPHONE (OUTPATIENT)
Dept: ORTHOPEDICS | Facility: CLINIC | Age: 46
End: 2021-01-13

## 2021-01-13 VITALS
BODY MASS INDEX: 30.59 KG/M2 | WEIGHT: 219.38 LBS | OXYGEN SATURATION: 98 % | DIASTOLIC BLOOD PRESSURE: 80 MMHG | TEMPERATURE: 98 F | HEART RATE: 62 BPM | SYSTOLIC BLOOD PRESSURE: 100 MMHG

## 2021-01-13 DIAGNOSIS — G89.29 WRIST PAIN, CHRONIC, LEFT: Primary | ICD-10-CM

## 2021-01-13 DIAGNOSIS — E66.9 OBESITY (BMI 30.0-34.9): ICD-10-CM

## 2021-01-13 DIAGNOSIS — M25.532 WRIST PAIN, CHRONIC, LEFT: Primary | ICD-10-CM

## 2021-01-13 DIAGNOSIS — G89.29 WRIST PAIN, CHRONIC, LEFT: ICD-10-CM

## 2021-01-13 DIAGNOSIS — M25.532 WRIST PAIN, CHRONIC, LEFT: ICD-10-CM

## 2021-01-13 PROCEDURE — 99999 PR PBB SHADOW E&M-EST. PATIENT-LVL IV: CPT | Mod: PBBFAC,,, | Performed by: FAMILY MEDICINE

## 2021-01-13 PROCEDURE — 99999 PR PBB SHADOW E&M-EST. PATIENT-LVL IV: ICD-10-PCS | Mod: PBBFAC,,, | Performed by: FAMILY MEDICINE

## 2021-01-13 PROCEDURE — 73110 X-RAY EXAM OF WRIST: CPT | Mod: 26,LT,, | Performed by: RADIOLOGY

## 2021-01-13 PROCEDURE — 73110 XR WRIST COMPLETE 3 VIEWS LEFT: ICD-10-PCS | Mod: 26,LT,, | Performed by: RADIOLOGY

## 2021-01-13 PROCEDURE — 99214 PR OFFICE/OUTPT VISIT, EST, LEVL IV, 30-39 MIN: ICD-10-PCS | Mod: S$GLB,,, | Performed by: FAMILY MEDICINE

## 2021-01-13 PROCEDURE — 99214 OFFICE O/P EST MOD 30 MIN: CPT | Mod: S$GLB,,, | Performed by: FAMILY MEDICINE

## 2021-01-13 PROCEDURE — 73110 X-RAY EXAM OF WRIST: CPT | Mod: TC,LT

## 2021-01-13 RX ORDER — METHYLPREDNISOLONE 4 MG/1
TABLET ORAL
Qty: 1 PACKAGE | Refills: 0 | Status: SHIPPED | OUTPATIENT
Start: 2021-01-13

## 2021-01-19 ENCOUNTER — TELEPHONE (OUTPATIENT)
Dept: ORTHOPEDICS | Facility: CLINIC | Age: 46
End: 2021-01-19

## 2021-01-19 ENCOUNTER — OFFICE VISIT (OUTPATIENT)
Dept: ORTHOPEDICS | Facility: CLINIC | Age: 46
End: 2021-01-19
Payer: COMMERCIAL

## 2021-01-19 VITALS — BODY MASS INDEX: 30.66 KG/M2 | WEIGHT: 219 LBS | HEIGHT: 71 IN

## 2021-01-19 DIAGNOSIS — S69.82XA TFCC (TRIANGULAR FIBROCARTILAGE COMPLEX) INJURY, LEFT, INITIAL ENCOUNTER: Primary | ICD-10-CM

## 2021-01-19 DIAGNOSIS — M25.532 WRIST PAIN, CHRONIC, LEFT: ICD-10-CM

## 2021-01-19 DIAGNOSIS — G89.29 WRIST PAIN, CHRONIC, LEFT: ICD-10-CM

## 2021-01-19 PROCEDURE — 99203 OFFICE O/P NEW LOW 30 MIN: CPT | Mod: S$GLB,,, | Performed by: STUDENT IN AN ORGANIZED HEALTH CARE EDUCATION/TRAINING PROGRAM

## 2021-01-19 PROCEDURE — 99999 PR PBB SHADOW E&M-EST. PATIENT-LVL III: CPT | Mod: PBBFAC,,, | Performed by: STUDENT IN AN ORGANIZED HEALTH CARE EDUCATION/TRAINING PROGRAM

## 2021-01-19 PROCEDURE — 99203 PR OFFICE/OUTPT VISIT, NEW, LEVL III, 30-44 MIN: ICD-10-PCS | Mod: S$GLB,,, | Performed by: STUDENT IN AN ORGANIZED HEALTH CARE EDUCATION/TRAINING PROGRAM

## 2021-01-19 PROCEDURE — 99999 PR PBB SHADOW E&M-EST. PATIENT-LVL III: ICD-10-PCS | Mod: PBBFAC,,, | Performed by: STUDENT IN AN ORGANIZED HEALTH CARE EDUCATION/TRAINING PROGRAM

## 2022-10-05 ENCOUNTER — PATIENT OUTREACH (OUTPATIENT)
Dept: ADMINISTRATIVE | Facility: HOSPITAL | Age: 47
End: 2022-10-05
Payer: COMMERCIAL

## 2025-03-25 ENCOUNTER — TELEPHONE (OUTPATIENT)
Dept: INTERNAL MEDICINE | Facility: CLINIC | Age: 50
End: 2025-03-25
Payer: COMMERCIAL

## 2025-03-25 NOTE — TELEPHONE ENCOUNTER
Sent pt MyChart message informing that he would need an appointment to re-establish care in order for any orders can be placed for him./Manuela  
- - -

## 2025-06-30 ENCOUNTER — OFFICE VISIT (OUTPATIENT)
Dept: INTERNAL MEDICINE | Facility: CLINIC | Age: 50
End: 2025-06-30
Payer: COMMERCIAL

## 2025-06-30 VITALS
TEMPERATURE: 97 F | DIASTOLIC BLOOD PRESSURE: 84 MMHG | OXYGEN SATURATION: 99 % | BODY MASS INDEX: 30.16 KG/M2 | HEART RATE: 61 BPM | RESPIRATION RATE: 18 BRPM | WEIGHT: 216.25 LBS | SYSTOLIC BLOOD PRESSURE: 120 MMHG

## 2025-06-30 DIAGNOSIS — Z23 VACCINE FOR STREPTOCOCCUS PNEUMONIAE AND INFLUENZA: ICD-10-CM

## 2025-06-30 DIAGNOSIS — Z00.00 ROUTINE GENERAL MEDICAL EXAMINATION AT A HEALTH CARE FACILITY: Primary | ICD-10-CM

## 2025-06-30 DIAGNOSIS — M47.816 OSTEOARTHRITIS OF LUMBAR SPINE, UNSPECIFIED SPINAL OSTEOARTHRITIS COMPLICATION STATUS: ICD-10-CM

## 2025-06-30 DIAGNOSIS — M25.511 CHRONIC RIGHT SHOULDER PAIN: ICD-10-CM

## 2025-06-30 DIAGNOSIS — G89.29 CHRONIC RIGHT SHOULDER PAIN: ICD-10-CM

## 2025-06-30 DIAGNOSIS — M62.838 CERVICAL PARASPINAL MUSCLE SPASM: ICD-10-CM

## 2025-06-30 DIAGNOSIS — Z12.11 COLON CANCER SCREENING: ICD-10-CM

## 2025-06-30 LAB
ABSOLUTE EOSINOPHIL (OHS): 0.17 K/UL
ABSOLUTE MONOCYTE (OHS): 0.42 K/UL (ref 0.3–1)
ABSOLUTE NEUTROPHIL COUNT (OHS): 1.95 K/UL (ref 1.8–7.7)
ALBUMIN SERPL BCP-MCNC: 3.9 G/DL (ref 3.5–5.2)
ALP SERPL-CCNC: 54 UNIT/L (ref 40–150)
ALT SERPL W/O P-5'-P-CCNC: 64 UNIT/L (ref 10–44)
ANION GAP (OHS): 9 MMOL/L (ref 8–16)
AST SERPL-CCNC: 84 UNIT/L (ref 11–45)
BASOPHILS # BLD AUTO: 0.02 K/UL
BASOPHILS NFR BLD AUTO: 0.5 %
BILIRUB SERPL-MCNC: 0.5 MG/DL (ref 0.1–1)
BILIRUB UR QL STRIP.AUTO: NEGATIVE
BUN SERPL-MCNC: 12 MG/DL (ref 6–20)
CALCIUM SERPL-MCNC: 9 MG/DL (ref 8.7–10.5)
CHLORIDE SERPL-SCNC: 108 MMOL/L (ref 95–110)
CHOLEST SERPL-MCNC: 171 MG/DL (ref 120–199)
CHOLEST/HDLC SERPL: 4.1 {RATIO} (ref 2–5)
CLARITY UR: CLEAR
CO2 SERPL-SCNC: 25 MMOL/L (ref 23–29)
COLOR UR AUTO: YELLOW
CREAT SERPL-MCNC: 1.1 MG/DL (ref 0.5–1.4)
EAG (OHS): 103 MG/DL (ref 68–131)
ERYTHROCYTE [DISTWIDTH] IN BLOOD BY AUTOMATED COUNT: 14.3 % (ref 11.5–14.5)
GFR SERPLBLD CREATININE-BSD FMLA CKD-EPI: >60 ML/MIN/1.73/M2
GLUCOSE SERPL-MCNC: 78 MG/DL (ref 70–110)
GLUCOSE UR QL STRIP: NEGATIVE
HBA1C MFR BLD: 5.2 % (ref 4–5.6)
HCT VFR BLD AUTO: 45.2 % (ref 40–54)
HDLC SERPL-MCNC: 42 MG/DL (ref 40–75)
HDLC SERPL: 24.6 % (ref 20–50)
HGB BLD-MCNC: 15.6 GM/DL (ref 14–18)
HGB UR QL STRIP: NEGATIVE
IMM GRANULOCYTES # BLD AUTO: 0.01 K/UL (ref 0–0.04)
IMM GRANULOCYTES NFR BLD AUTO: 0.3 % (ref 0–0.5)
KETONES UR QL STRIP: NEGATIVE
LDLC SERPL CALC-MCNC: 107.8 MG/DL (ref 63–159)
LEUKOCYTE ESTERASE UR QL STRIP: NEGATIVE
LYMPHOCYTES # BLD AUTO: 1.38 K/UL (ref 1–4.8)
MCH RBC QN AUTO: 32.2 PG (ref 27–31)
MCHC RBC AUTO-ENTMCNC: 34.5 G/DL (ref 32–36)
MCV RBC AUTO: 93 FL (ref 82–98)
NITRITE UR QL STRIP: NEGATIVE
NONHDLC SERPL-MCNC: 129 MG/DL
NUCLEATED RBC (/100WBC) (OHS): 0 /100 WBC
PH UR STRIP: 6 [PH]
PLATELET # BLD AUTO: 213 K/UL (ref 150–450)
PMV BLD AUTO: 10.9 FL (ref 9.2–12.9)
POTASSIUM SERPL-SCNC: 4.6 MMOL/L (ref 3.5–5.1)
PROT SERPL-MCNC: 7 GM/DL (ref 6–8.4)
PROT UR QL STRIP: NEGATIVE
RBC # BLD AUTO: 4.85 M/UL (ref 4.6–6.2)
RELATIVE EOSINOPHIL (OHS): 4.3 %
RELATIVE LYMPHOCYTE (OHS): 34.9 % (ref 18–48)
RELATIVE MONOCYTE (OHS): 10.6 % (ref 4–15)
RELATIVE NEUTROPHIL (OHS): 49.4 % (ref 38–73)
SODIUM SERPL-SCNC: 142 MMOL/L (ref 136–145)
SP GR UR STRIP: 1.01
TRIGL SERPL-MCNC: 106 MG/DL (ref 30–150)
TSH SERPL-ACNC: 2.12 UIU/ML (ref 0.4–4)
WBC # BLD AUTO: 3.95 K/UL (ref 3.9–12.7)

## 2025-06-30 PROCEDURE — 3074F SYST BP LT 130 MM HG: CPT | Mod: CPTII,S$GLB,, | Performed by: FAMILY MEDICINE

## 2025-06-30 PROCEDURE — 99999 PR PBB SHADOW E&M-EST. PATIENT-LVL IV: CPT | Mod: PBBFAC,,, | Performed by: FAMILY MEDICINE

## 2025-06-30 PROCEDURE — 90471 IMMUNIZATION ADMIN: CPT | Mod: S$GLB,,, | Performed by: FAMILY MEDICINE

## 2025-06-30 PROCEDURE — 90677 PCV20 VACCINE IM: CPT | Mod: S$GLB,,, | Performed by: FAMILY MEDICINE

## 2025-06-30 PROCEDURE — 81003 URINALYSIS AUTO W/O SCOPE: CPT | Performed by: FAMILY MEDICINE

## 2025-06-30 PROCEDURE — 1159F MED LIST DOCD IN RCRD: CPT | Mod: CPTII,S$GLB,, | Performed by: FAMILY MEDICINE

## 2025-06-30 PROCEDURE — 84443 ASSAY THYROID STIM HORMONE: CPT | Performed by: FAMILY MEDICINE

## 2025-06-30 PROCEDURE — 99396 PREV VISIT EST AGE 40-64: CPT | Mod: 25,S$GLB,, | Performed by: FAMILY MEDICINE

## 2025-06-30 PROCEDURE — 3008F BODY MASS INDEX DOCD: CPT | Mod: CPTII,S$GLB,, | Performed by: FAMILY MEDICINE

## 2025-06-30 PROCEDURE — 83036 HEMOGLOBIN GLYCOSYLATED A1C: CPT | Performed by: FAMILY MEDICINE

## 2025-06-30 PROCEDURE — 80061 LIPID PANEL: CPT | Performed by: FAMILY MEDICINE

## 2025-06-30 PROCEDURE — 85025 COMPLETE CBC W/AUTO DIFF WBC: CPT | Performed by: FAMILY MEDICINE

## 2025-06-30 PROCEDURE — 3079F DIAST BP 80-89 MM HG: CPT | Mod: CPTII,S$GLB,, | Performed by: FAMILY MEDICINE

## 2025-06-30 PROCEDURE — 99213 OFFICE O/P EST LOW 20 MIN: CPT | Mod: 25,S$GLB,, | Performed by: FAMILY MEDICINE

## 2025-06-30 PROCEDURE — 80053 COMPREHEN METABOLIC PANEL: CPT | Performed by: FAMILY MEDICINE

## 2025-06-30 RX ORDER — MELOXICAM 7.5 MG/1
7.5 TABLET ORAL DAILY PRN
Qty: 30 TABLET | Refills: 0 | Status: SHIPPED | OUTPATIENT
Start: 2025-06-30

## 2025-06-30 NOTE — PROGRESS NOTES
Subjective:       Patient ID: Dedrick Brooks is a 50 y.o. male presents with Problem List[1]     Chief Complaint: Annual Exam    History of Present Illness    Dedrick presents today for a physical exam and colonoscopy clearance. He reports chronic right shoulder pain with history of shoulder fracture during college. He experiences bone-to-bone sensation with rotation accompanied by burning sensation and grinding sound during movement. Pain is exacerbated by heavy lifting at the gym and sleeping on affected side. He denies radiation, tingling, numbness, or weakness in the affected shoulder. Pain intensity varies with activity level, becoming more pronounced with excessive weight lifting and applied pressure. He also reports neck tightness. He exercises at least three days per week. He experiences occasional fatigue and requires motivation to initiate exercise some days, but denies persistent or significant exercise-related exhaustion. He reports poor sleep quality. He denies any known chronic medical conditions. He is not taking any medications, multivitamins, or supplements.      ROS:  General: -fever, -chills, +fatigue, -weight gain, -weight loss, -loss of appetite  Eyes: -vision changes, -blurry vision, -eye pain, -eye discharge  ENT: -ear pain, -hearing loss, -tinnitus, -nasal congestion, -sore throat  Cardiovascular: -chest pain, -palpitations, -lower extremity edema  Respiratory: -cough, -shortness of breath, -wheezing, -sputum production  Endocrine: -polyuria, -polydipsia, -heat intolerance, -cold intolerance  Gastrointestinal: -abdominal pain, -heartburn, -nausea, -vomiting, -diarrhea, -constipation, -blood in stool  Genitourinary: -dysuria, -urgency, -frequency, -hematuria, -nocturia, -incontinence  Heme & Lymphatic: -easy or excessive bleeding, -easy bruising, -swollen lymph nodes  Musculoskeletal: +muscle pain, -back pain, +joint pain, -joint swelling, +pain with movement, +burning sensation  Skin: -rash,  -lesion, -itching, -skin texture changes, -skin color changes  Neurological: -headache, -dizziness, -numbness, -tingling, -seizure activity, -speech difficulty, -memory loss, -confusion  Psychiatric: -anxiety, -depression, -sleep difficulty  Neck: +neck stiffness, +neck tension                /84 (BP Location: Right arm, Patient Position: Sitting)   Pulse 61   Temp 96.8 °F (36 °C) (Temporal)   Resp 18   Wt 98.1 kg (216 lb 4.3 oz)   SpO2 99%   BMI 30.16 kg/m²   Objective:      Physical Exam  Constitutional:       Appearance: He is well-developed.   HENT:      Head: Normocephalic and atraumatic.   Cardiovascular:      Rate and Rhythm: Normal rate and regular rhythm.      Heart sounds: Normal heart sounds. No murmur heard.  Pulmonary:      Effort: Pulmonary effort is normal.      Breath sounds: Normal breath sounds. No wheezing.   Abdominal:      General: Bowel sounds are normal.      Palpations: Abdomen is soft.      Tenderness: There is no abdominal tenderness.   Musculoskeletal:         General: Tenderness present.      Comments: Positive for tightness to the paraspinal cervical muscles on the right side and no significant tenderness noted to the right shoulder anteriorly but noted crepitus on exam today   Skin:     General: Skin is warm and dry.      Findings: No rash.   Neurological:      Mental Status: He is alert and oriented to person, place, and time.   Psychiatric:         Mood and Affect: Mood normal.           Assessment/Plan:   1. Routine general medical examination at a health care facility  -     Urinalysis, Reflex to Urine Culture Urine, Clean Catch  -     Hemoglobin A1C  -     TSH  -     Lipid Panel  -     Comprehensive Metabolic Panel  -     CBC Auto Differential  -     Ambulatory referral/consult to Endo Procedure ; Future; Expected date: 07/01/2025  Vital signs stable today.  Clinical exam stable  Continue lifestyle modifications with low-fat and low-cholesterol diet and  exercise 30 minutes daily    2. Colon cancer screening  -     Ambulatory referral/consult to Endo Procedure ; Future; Expected date: 07/01/2025  Due for colonoscopy    3. Vaccine for streptococcus pneumoniae and influenza  -     pneumoc 20-modesta conj-dip cr(PF) (PREVNAR-20 (PF)) injection Syrg 0.5 mL  Prevnar given today    4. Osteoarthritis of lumbar spine, unspecified spinal osteoarthritis complication status  Stable and asymptomatic    5. Chronic right shoulder pain  -     Ambulatory referral/consult to Orthopedics; Future; Expected date: 07/07/2025  -     meloxicam (MOBIC) 7.5 MG tablet; Take 1 tablet (7.5 mg total) by mouth daily as needed for Pain.  Dispense: 30 tablet; Refill: 0  Secondary to chronicity of right shoulder pain will refer to Orthopedic for further evaluation meloxicam prescribed today for symptomatic relief    6. Cervical paraspinal muscle spasm  -     meloxicam (MOBIC) 7.5 MG tablet; Take 1 tablet (7.5 mg total) by mouth daily as needed for Pain.  Dispense: 30 tablet; Refill: 0  Could be secondary to muscle spasm  Warm compresses recommended and okay to take meloxicam as needed           This note was generated with the assistance of ambient listening technology. Verbal consent was obtained by the patient and accompanying visitor(s) for the recording of patient appointment to facilitate this note. I attest to having reviewed and edited the generated note for accuracy, though some syntax or spelling errors may persist. Please contact the author of this note for any clarification.            [1]   Patient Active Problem List  Diagnosis    Osteoarthritis of lumbar spine

## 2025-06-30 NOTE — NURSING
Prevnar vaccine admin as ordered, given in (R) deltoid, pt tolerated well. Advised to wait 15 min for any s/s adverse/allergic reactions, none noted, pt ok to leave. NADN.//TF

## 2025-07-01 ENCOUNTER — RESULTS FOLLOW-UP (OUTPATIENT)
Dept: INTERNAL MEDICINE | Facility: CLINIC | Age: 50
End: 2025-07-01

## 2025-07-01 LAB — HOLD SPECIMEN: NORMAL

## 2025-07-07 ENCOUNTER — HOSPITAL ENCOUNTER (OUTPATIENT)
Dept: PREADMISSION TESTING | Facility: HOSPITAL | Age: 50
Discharge: HOME | End: 2025-07-07
Attending: COLON & RECTAL SURGERY
Payer: COMMERCIAL

## 2025-07-07 DIAGNOSIS — Z00.00 ROUTINE GENERAL MEDICAL EXAMINATION AT A HEALTH CARE FACILITY: Primary | ICD-10-CM

## 2025-07-07 DIAGNOSIS — Z12.11 COLON CANCER SCREENING: ICD-10-CM

## 2025-07-07 RX ORDER — SODIUM, POTASSIUM,MAG SULFATES 17.5-3.13G
1 SOLUTION, RECONSTITUTED, ORAL ORAL DAILY
Qty: 1 KIT | Refills: 0 | Status: SHIPPED | OUTPATIENT
Start: 2025-07-07 | End: 2025-07-09

## 2025-07-16 ENCOUNTER — ANESTHESIA EVENT (OUTPATIENT)
Dept: ENDOSCOPY | Facility: HOSPITAL | Age: 50
End: 2025-07-16
Payer: COMMERCIAL

## 2025-07-16 NOTE — ANESTHESIA PREPROCEDURE EVALUATION
07/16/2025  Dedrick Brooks is a 50 y.o., male.      Pre-op Assessment    I have reviewed the Patient Summary Reports.     I have reviewed the Nursing Notes. I have reviewed the NPO Status.   I have reviewed the Medications.     Review of Systems  Anesthesia Hx:  No problems with previous Anesthesia             Denies Family Hx of Anesthesia complications.    Denies Personal Hx of Anesthesia complications.                    Social:  Former Smoker, Alcohol Use       Hepatic/GI:  Bowel Prep.                   Musculoskeletal:         Spine Disorders: lumbar Degenerative disease           Endocrine:        Denies Obesity / BMI > 30      Physical Exam  General: Well nourished, Cooperative, Alert and Oriented    Airway:  Mallampati: I   Mouth Opening: Normal  TM Distance: Normal  Tongue: Normal  Neck ROM: Normal ROM    Dental:  Intact        Anesthesia Plan  Type of Anesthesia, risks & benefits discussed:    Anesthesia Type: Gen Natural Airway  Intra-op Monitoring Plan: Standard ASA Monitors  Post Op Pain Control Plan: multimodal analgesia  Induction:  IV  Informed Consent: Informed consent signed with the Patient and all parties understand the risks and agree with anesthesia plan.  All questions answered. Patient consented to blood products? No  ASA Score: 2  Day of Surgery Review of History & Physical: H&P Update referred to the surgeon/provider.    Ready For Surgery From Anesthesia Perspective.     .

## 2025-07-18 ENCOUNTER — ANESTHESIA (OUTPATIENT)
Dept: ENDOSCOPY | Facility: HOSPITAL | Age: 50
End: 2025-07-18
Payer: COMMERCIAL

## 2025-07-18 ENCOUNTER — HOSPITAL ENCOUNTER (OUTPATIENT)
Dept: ENDOSCOPY | Facility: HOSPITAL | Age: 50
Discharge: HOME | End: 2025-07-18
Attending: FAMILY MEDICINE
Payer: COMMERCIAL

## 2025-07-18 VITALS
OXYGEN SATURATION: 98 % | HEART RATE: 71 BPM | BODY MASS INDEX: 28.94 KG/M2 | TEMPERATURE: 97 F | WEIGHT: 206.69 LBS | SYSTOLIC BLOOD PRESSURE: 110 MMHG | HEIGHT: 71 IN | DIASTOLIC BLOOD PRESSURE: 91 MMHG | RESPIRATION RATE: 19 BRPM

## 2025-07-18 DIAGNOSIS — Z00.00 ROUTINE GENERAL MEDICAL EXAMINATION AT A HEALTH CARE FACILITY: ICD-10-CM

## 2025-07-18 DIAGNOSIS — Z12.11 COLON CANCER SCREENING: Primary | ICD-10-CM

## 2025-07-18 DIAGNOSIS — Z12.11 COLON CANCER SCREENING: ICD-10-CM

## 2025-07-18 PROCEDURE — 37000008 HC ANESTHESIA 1ST 15 MINUTES

## 2025-07-18 PROCEDURE — G0121 COLON CA SCRN NOT HI RSK IND: HCPCS | Mod: 74 | Performed by: INTERNAL MEDICINE

## 2025-07-18 PROCEDURE — 25000003 PHARM REV CODE 250: Performed by: ANESTHESIOLOGY

## 2025-07-18 PROCEDURE — G0121 COLON CA SCRN NOT HI RSK IND: HCPCS | Mod: 53,,, | Performed by: INTERNAL MEDICINE

## 2025-07-18 PROCEDURE — 63600175 PHARM REV CODE 636 W HCPCS: Performed by: ANESTHESIOLOGY

## 2025-07-18 RX ORDER — SODIUM, POTASSIUM,MAG SULFATES 17.5-3.13G
1 SOLUTION, RECONSTITUTED, ORAL ORAL DAILY
Qty: 1 KIT | Refills: 0 | Status: SHIPPED | OUTPATIENT
Start: 2025-07-18 | End: 2025-07-20

## 2025-07-18 RX ORDER — PROPOFOL 10 MG/ML
VIAL (ML) INTRAVENOUS
Status: DISCONTINUED | OUTPATIENT
Start: 2025-07-18 | End: 2025-07-18

## 2025-07-18 RX ORDER — LIDOCAINE HYDROCHLORIDE 20 MG/ML
INJECTION INTRAVENOUS
Status: DISCONTINUED | OUTPATIENT
Start: 2025-07-18 | End: 2025-07-18

## 2025-07-18 RX ADMIN — PROPOFOL 50 MG: 10 INJECTION, EMULSION INTRAVENOUS at 09:07

## 2025-07-18 RX ADMIN — GLYCOPYRROLATE 0.2 MG: 0.2 INJECTION, SOLUTION INTRAMUSCULAR; INTRAVENOUS at 09:07

## 2025-07-18 RX ADMIN — SODIUM CHLORIDE: 9 INJECTION, SOLUTION INTRAVENOUS at 09:07

## 2025-07-18 RX ADMIN — PROPOFOL 150 MG: 10 INJECTION, EMULSION INTRAVENOUS at 09:07

## 2025-07-18 RX ADMIN — LIDOCAINE HYDROCHLORIDE 60 MG: 20 INJECTION INTRAVENOUS at 09:07

## 2025-07-18 NOTE — PLAN OF CARE
Discharge instructions reviewed with patient and visitor. Handouts given & verbalized understanding with no further questions at this time.  Made aware that an extended prep is needed, extended prep instructions given, pt verbalized understanding.  VSS on RA, no pain or nausea noted, tolerating po fluids, no complaints noted. Fall precautions reviewed, consents in chart, PIV removed at this time.

## 2025-07-18 NOTE — H&P
"PRE PROCEDURE H&P    Patient Name: Dedrick Brooks  MRN: [id number]  : 1975  Date of Procedure:  2025  Referring Physician: Salud Mccauley MD  Primary Physician: Salud Mccauley MD  Procedure Physician: Sherry Hernandez MD       Planned Procedure: Colonoscopy  Diagnosis: screening for colon cancer  Chief Complaint: Same as above    HPI: Patient is an 50 y.o. male is here for the above.     Last colonoscopy: Index colonoscopy   Family history: None  Anticoagulation: None    Past Medical History:   History reviewed. No pertinent past medical history.     Past Surgical History:  History reviewed. No pertinent surgical history.     Home Medications:  Prior to Admission medications    Medication Sig Start Date End Date Taking? Authorizing Provider   meloxicam (MOBIC) 7.5 MG tablet Take 1 tablet (7.5 mg total) by mouth daily as needed for Pain. 25  Yes Salud Mccauley MD        Allergies:  Review of patient's allergies indicates:  No Known Allergies     Social History:   Social History[1]    Family History:  Family History   Problem Relation Name Age of Onset    No Known Problems Mother      No Known Problems Father         ROS: No acute cardiac events, no acute respiratory complaints.     Physical Exam (all patients):    /88   Pulse 74   Temp 97.1 °F (36.2 °C)   Resp 16   Ht 5' 11" (1.803 m)   Wt 93.8 kg (206 lb 10.9 oz)   SpO2 97%   BMI 28.83 kg/m²   Lungs: Clear to auscultation bilaterally, respirations unlabored  Heart: Regular rate and rhythm, S1 and S2 normal, no obvious murmurs  Abdomen:         Soft, non-tender, bowel sounds normal, no masses, no organomegaly    Lab Results   Component Value Date    WBC 3.95 2025    MCV 93 2025    RDW 14.3 2025     2025    INR 1.0 10/13/2020    GLU 78 2025    HGBA1C 5.2 2025    BUN 12 2025     2025    K 4.6 2025     2025        SEDATION PLAN: per anesthesia      History reviewed, " vital signs satisfactory, cardiopulmonary status satisfactory, sedation options, risks and plans have been discussed with the patient  All their questions were answered and the patient agrees to the sedation procedures as planned and the patient is deemed an appropriate candidate for the sedation as planned.    Procedure explained to patient, informed consent obtained and placed in chart.    Sherry Hernandez  7/18/2025  9:53 AM          [1]   Social History  Socioeconomic History    Marital status:     Number of children: 1   Tobacco Use    Smoking status: Former     Types: Cigarettes    Smokeless tobacco: Never   Substance and Sexual Activity    Alcohol use: Yes     Alcohol/week: 6.0 standard drinks of alcohol     Types: 6 Standard drinks or equivalent per week     Comment: social drinker    Drug use: Never    Sexual activity: Yes     Partners: Female     Birth control/protection: None     Social Drivers of Health     Stress: No Stress Concern Present (7/7/2020)    [place] [employer] - Occupational Stress Questionnaire     Feeling of Stress : Not at all

## 2025-07-18 NOTE — TRANSFER OF CARE
"Anesthesia Transfer of Care Note    Patient: Dedrick Brooks    Procedure(s) Performed: * No procedures listed *    Patient location: PACU    Anesthesia Type: general    Transport from OR: Transported from OR on room air with adequate spontaneous ventilation    Post pain: adequate analgesia    Post assessment: no apparent anesthetic complications and tolerated procedure well    Post vital signs: stable    Level of consciousness: sedated    Nausea/Vomiting: no nausea/vomiting    Complications: none    Transfer of care protocol was followed      Last vitals: Visit Vitals  /72   Pulse 85   Temp 36.2 °C (97.1 °F)   Resp 16   Ht 5' 11" (1.803 m)   Wt 93.8 kg (206 lb 10.9 oz)   SpO2 96%   BMI 28.83 kg/m²     "

## 2025-07-18 NOTE — ANESTHESIA POSTPROCEDURE EVALUATION
Anesthesia Post Evaluation    Patient: Dedrick Brooks    Procedure(s) Performed: * No procedures listed *    Final Anesthesia Type: general      Patient location during evaluation: PACU  Patient participation: Yes- Able to Participate  Level of consciousness: awake and alert and oriented  Post-procedure vital signs: reviewed and stable  Pain management: adequate  Airway patency: patent    PONV status at discharge: No PONV  Anesthetic complications: no      Cardiovascular status: blood pressure returned to baseline, stable and hemodynamically stable  Respiratory status: unassisted  Hydration status: euvolemic  Follow-up not needed.              Vitals Value Taken Time   /86 07/18/25 10:29   Temp 36.2 °C (97.1 °F) 07/18/25 10:04   Pulse 64 07/18/25 10:29   Resp 24 07/18/25 10:29   SpO2 99 % 07/18/25 10:29   Vitals shown include unfiled device data.      No case tracking events are documented in the log.      Pain/Dominique Score: Dominique Score: 10 (7/18/2025 10:19 AM)

## 2025-07-21 VITALS
DIASTOLIC BLOOD PRESSURE: 91 MMHG | HEIGHT: 71 IN | TEMPERATURE: 97 F | OXYGEN SATURATION: 98 % | BODY MASS INDEX: 28.94 KG/M2 | HEART RATE: 71 BPM | WEIGHT: 206.69 LBS | RESPIRATION RATE: 19 BRPM | SYSTOLIC BLOOD PRESSURE: 110 MMHG

## 2025-07-22 ENCOUNTER — ANESTHESIA EVENT (OUTPATIENT)
Dept: ENDOSCOPY | Facility: HOSPITAL | Age: 50
End: 2025-07-22
Payer: COMMERCIAL

## 2025-07-22 NOTE — ANESTHESIA PREPROCEDURE EVALUATION
07/22/2025  Dedrick Brooks is a 50 y.o., male.  No past medical history on file.  No past surgical history on file.      Pre-op Assessment    I have reviewed the Patient Summary Reports.     I have reviewed the Nursing Notes. I have reviewed the NPO Status.   I have reviewed the Medications.     Review of Systems  Anesthesia Hx:  No problems with previous Anesthesia   Neg history of prior surgery.            Denies Personal Hx of Anesthesia complications.                    Social:  Former Smoker, Social Alcohol Use       Hepatic/GI:  Bowel Prep.    Liver Disease,                   Physical Exam  General: Well nourished, Cooperative, Alert and Oriented    Airway:  Mallampati: II   Mouth Opening: Normal  Tongue: Normal  Neck ROM: Normal ROM        Anesthesia Plan  Type of Anesthesia, risks & benefits discussed:    Anesthesia Type: Gen Natural Airway  Intra-op Monitoring Plan: Standard ASA Monitors  Post Op Pain Control Plan: multimodal analgesia  Induction:  IV  Informed Consent: Informed consent signed with the Patient and all parties understand the risks and agree with anesthesia plan.  All questions answered. Patient consented to blood products? No  ASA Score: 1  Day of Surgery Review of History & Physical: H&P Update referred to the surgeon/provider.    Ready For Surgery From Anesthesia Perspective.     .

## 2025-07-24 ENCOUNTER — HOSPITAL ENCOUNTER (OUTPATIENT)
Dept: ENDOSCOPY | Facility: HOSPITAL | Age: 50
Discharge: HOME | End: 2025-07-24
Attending: INTERNAL MEDICINE
Payer: COMMERCIAL

## 2025-07-24 ENCOUNTER — ANESTHESIA (OUTPATIENT)
Dept: ENDOSCOPY | Facility: HOSPITAL | Age: 50
End: 2025-07-24
Payer: COMMERCIAL

## 2025-07-24 VITALS
OXYGEN SATURATION: 99 % | TEMPERATURE: 98 F | RESPIRATION RATE: 17 BRPM | SYSTOLIC BLOOD PRESSURE: 121 MMHG | DIASTOLIC BLOOD PRESSURE: 88 MMHG | HEIGHT: 71 IN | WEIGHT: 205.81 LBS | HEART RATE: 60 BPM | BODY MASS INDEX: 28.81 KG/M2

## 2025-07-24 DIAGNOSIS — Z12.11 COLON CANCER SCREENING: ICD-10-CM

## 2025-07-24 PROCEDURE — 63600175 PHARM REV CODE 636 W HCPCS: Performed by: ANESTHESIOLOGY

## 2025-07-24 PROCEDURE — 25000003 PHARM REV CODE 250: Performed by: INTERNAL MEDICINE

## 2025-07-24 PROCEDURE — 88305 TISSUE EXAM BY PATHOLOGIST: CPT | Mod: TC | Performed by: INTERNAL MEDICINE

## 2025-07-24 RX ORDER — PROPOFOL 10 MG/ML
VIAL (ML) INTRAVENOUS
Status: DISCONTINUED | OUTPATIENT
Start: 2025-07-24 | End: 2025-07-24

## 2025-07-24 RX ORDER — LIDOCAINE HYDROCHLORIDE 20 MG/ML
INJECTION INTRAVENOUS
Status: DISCONTINUED | OUTPATIENT
Start: 2025-07-24 | End: 2025-07-24

## 2025-07-24 RX ORDER — DEXTROMETHORPHAN/PSEUDOEPHED 2.5-7.5/.8
DROPS ORAL
Status: COMPLETED | OUTPATIENT
Start: 2025-07-24 | End: 2025-07-24

## 2025-07-24 RX ADMIN — SODIUM CHLORIDE, POTASSIUM CHLORIDE, SODIUM LACTATE AND CALCIUM CHLORIDE: 600; 310; 30; 20 INJECTION, SOLUTION INTRAVENOUS at 09:07

## 2025-07-24 RX ADMIN — PROPOFOL 150 MG: 10 INJECTION, EMULSION INTRAVENOUS at 09:07

## 2025-07-24 RX ADMIN — GLYCOPYRROLATE 0.2 MG: 0.2 INJECTION, SOLUTION INTRAMUSCULAR; INTRAVENOUS at 09:07

## 2025-07-24 RX ADMIN — PROPOFOL 50 MG: 10 INJECTION, EMULSION INTRAVENOUS at 09:07

## 2025-07-24 RX ADMIN — LIDOCAINE HYDROCHLORIDE 60 MG: 20 INJECTION INTRAVENOUS at 09:07

## 2025-07-24 RX ADMIN — SIMETHICONE 40 MG: 20 SUSPENSION/ DROPS ORAL at 09:07

## 2025-07-24 NOTE — H&P
"PRE PROCEDURE H&P    Patient Name: Dedrick Brooks  MRN: [id number]  : 1975  Date of Procedure:  2025  Referring Physician: Sherry Hernandez MD  Primary Physician: Salud Mccauley MD  Procedure Physician: Sherry Hernandez MD       Planned Procedure: Colonoscopy  Diagnosis: screening for colon cancer  Chief Complaint: Same as above    HPI: Patient is an 50 y.o. male is here for the above.     Last colonoscopy: Poor prep this month. This will serve as index colonoscopy  Family history: None  Anticoagulation: None    Past Medical History:   History reviewed. No pertinent past medical history.     Past Surgical History:  History reviewed. No pertinent surgical history.     Home Medications:  Prior to Admission medications    Medication Sig Start Date End Date Taking? Authorizing Provider   meloxicam (MOBIC) 7.5 MG tablet Take 1 tablet (7.5 mg total) by mouth daily as needed for Pain. 25   Salud Mccauley MD        Allergies:  Review of patient's allergies indicates:  No Known Allergies     Social History:   Social History[1]    Family History:  Family History   Problem Relation Name Age of Onset    No Known Problems Mother      No Known Problems Father         ROS: No acute cardiac events, no acute respiratory complaints.     Physical Exam (all patients):    /84 (BP Location: Right arm, Patient Position: Sitting)   Pulse 64   Temp 97.6 °F (36.4 °C) (Temporal)   Resp 16   Ht 5' 11" (1.803 m)   Wt 93.3 kg (205 lb 12.8 oz)   SpO2 98%   BMI 28.70 kg/m²   Lungs: Clear to auscultation bilaterally, respirations unlabored  Heart: Regular rate and rhythm, S1 and S2 normal, no obvious murmurs  Abdomen:         Soft, non-tender, bowel sounds normal, no masses, no organomegaly    Lab Results   Component Value Date    WBC 3.95 2025    MCV 93 2025    RDW 14.3 2025     2025    INR 1.0 10/13/2020    GLU 78 2025    HGBA1C 5.2 2025    BUN 12 2025     " 06/30/2025    K 4.6 06/30/2025     06/30/2025        SEDATION PLAN: per anesthesia      History reviewed, vital signs satisfactory, cardiopulmonary status satisfactory, sedation options, risks and plans have been discussed with the patient  All their questions were answered and the patient agrees to the sedation procedures as planned and the patient is deemed an appropriate candidate for the sedation as planned.    Procedure explained to patient, informed consent obtained and placed in chart.    Sherry Hernandez  7/24/2025  9:10 AM          [1]   Social History  Socioeconomic History    Marital status:     Number of children: 1   Tobacco Use    Smoking status: Former     Types: Cigarettes    Smokeless tobacco: Never   Substance and Sexual Activity    Alcohol use: Yes     Alcohol/week: 6.0 standard drinks of alcohol     Types: 6 Standard drinks or equivalent per week     Comment: social drinker    Drug use: Never    Sexual activity: Yes     Partners: Female     Birth control/protection: None     Social Drivers of Health     Stress: No Stress Concern Present (7/7/2020)    [employer] - Occupational Stress Questionnaire     Feeling of Stress : Not at all

## 2025-07-24 NOTE — PLAN OF CARE
Discharge instructions reviewed with pt and mother, handouts given, verbalized understanding with no further questions at this time. Dr. Hernandez spoke to pt at bedside, reviewed procedure and answered questions aware they are awaiting biopsy results. VSS on RA, no pain or nausea noted, tolerating po fluids without difficulty, no other complaints noted. Fall precautions reviewed, consents in chart.

## 2025-07-24 NOTE — ANESTHESIA POSTPROCEDURE EVALUATION
Anesthesia Post Evaluation    Patient: Dedrick Brooks    Procedure(s) Performed: * No procedures listed *    Final Anesthesia Type: general      Patient location during evaluation: PACU  Patient participation: Yes- Able to Participate  Level of consciousness: awake  Post-procedure vital signs: reviewed and stable  Pain management: adequate  Airway patency: patent    PONV status at discharge: No PONV  Anesthetic complications: no      Cardiovascular status: stable  Respiratory status: unassisted  Hydration status: euvolemic  Follow-up not needed.              Vitals Value Taken Time   /79 07/24/25 10:02   Temp 36.4 °C (97.5 °F) 07/24/25 10:02   Pulse 82 07/24/25 10:02   Resp 17 07/24/25 10:02   SpO2 97 % 07/24/25 10:02         No case tracking events are documented in the log.      Pain/Dominique Score: No data recorded

## 2025-07-24 NOTE — TRANSFER OF CARE
"Anesthesia Transfer of Care Note    Patient: Dedrick Brooks    Procedure(s) Performed: * No procedures listed *    Patient location: PACU    Anesthesia Type: general    Transport from OR: Transported from OR on room air with adequate spontaneous ventilation    Post pain: adequate analgesia    Post assessment: no apparent anesthetic complications and tolerated procedure well    Post vital signs: stable    Level of consciousness: awake    Nausea/Vomiting: no nausea/vomiting    Complications: none    Transfer of care protocol was followed      Last vitals: Visit Vitals  /84 (BP Location: Right arm, Patient Position: Sitting)   Pulse 64   Temp 36.4 °C (97.6 °F) (Temporal)   Resp 16   Ht 5' 11" (1.803 m)   Wt 93.3 kg (205 lb 12.8 oz)   SpO2 98%   BMI 28.70 kg/m²     "

## 2025-07-25 LAB
ESTROGEN SERPL-MCNC: NORMAL PG/ML
INSULIN SERPL-ACNC: NORMAL U[IU]/ML
LAB AP CLINICAL INFORMATION: NORMAL
LAB AP GROSS DESCRIPTION: NORMAL
LAB AP PERFORMING LOCATION(S): NORMAL
LAB AP REPORT FOOTNOTES: NORMAL

## 2025-07-29 DIAGNOSIS — M62.838 CERVICAL PARASPINAL MUSCLE SPASM: ICD-10-CM

## 2025-07-29 DIAGNOSIS — M25.511 CHRONIC RIGHT SHOULDER PAIN: ICD-10-CM

## 2025-07-29 DIAGNOSIS — G89.29 CHRONIC RIGHT SHOULDER PAIN: ICD-10-CM

## 2025-07-29 RX ORDER — MELOXICAM 7.5 MG/1
7.5 TABLET ORAL DAILY PRN
Qty: 30 TABLET | Refills: 0 | Status: SHIPPED | OUTPATIENT
Start: 2025-07-29

## 2025-07-29 NOTE — TELEPHONE ENCOUNTER
No care due was identified.  [facility] Embedded Care Due Messages. Reference number: [id number].   7/29/2025 10:38:38 AM CDT

## 2025-08-13 DIAGNOSIS — M25.511 RIGHT SHOULDER PAIN, UNSPECIFIED CHRONICITY: Primary | ICD-10-CM

## 2025-08-18 ENCOUNTER — OFFICE VISIT (OUTPATIENT)
Dept: SPORTS MEDICINE | Facility: CLINIC | Age: 50
End: 2025-08-18
Payer: COMMERCIAL

## 2025-08-18 ENCOUNTER — HOSPITAL ENCOUNTER (OUTPATIENT)
Dept: RADIOLOGY | Facility: HOSPITAL | Age: 50
Discharge: HOME | End: 2025-08-18
Attending: STUDENT IN AN ORGANIZED HEALTH CARE EDUCATION/TRAINING PROGRAM
Payer: COMMERCIAL

## 2025-08-18 DIAGNOSIS — M25.511 RIGHT SHOULDER PAIN, UNSPECIFIED CHRONICITY: ICD-10-CM

## 2025-08-18 DIAGNOSIS — G89.29 CHRONIC RIGHT SHOULDER PAIN: ICD-10-CM

## 2025-08-18 DIAGNOSIS — M25.511 CHRONIC RIGHT SHOULDER PAIN: ICD-10-CM

## 2025-08-18 DIAGNOSIS — M19.011 OSTEOARTHRITIS OF RIGHT ACROMIOCLAVICULAR JOINT: Primary | ICD-10-CM

## 2025-08-18 PROCEDURE — 20606 DRAIN/INJ JOINT/BURSA W/US: CPT | Mod: RT,S$GLB,, | Performed by: STUDENT IN AN ORGANIZED HEALTH CARE EDUCATION/TRAINING PROGRAM

## 2025-08-18 PROCEDURE — 1159F MED LIST DOCD IN RCRD: CPT | Mod: CPTII,S$GLB,, | Performed by: STUDENT IN AN ORGANIZED HEALTH CARE EDUCATION/TRAINING PROGRAM

## 2025-08-18 PROCEDURE — 99204 OFFICE O/P NEW MOD 45 MIN: CPT | Mod: 25,S$GLB,, | Performed by: STUDENT IN AN ORGANIZED HEALTH CARE EDUCATION/TRAINING PROGRAM

## 2025-08-18 PROCEDURE — 99999 PR PBB SHADOW E&M-EST. PATIENT-LVL III: CPT | Mod: PBBFAC,,, | Performed by: STUDENT IN AN ORGANIZED HEALTH CARE EDUCATION/TRAINING PROGRAM

## 2025-08-18 PROCEDURE — 3044F HG A1C LEVEL LT 7.0%: CPT | Mod: CPTII,S$GLB,, | Performed by: STUDENT IN AN ORGANIZED HEALTH CARE EDUCATION/TRAINING PROGRAM

## 2025-08-18 PROCEDURE — 73030 X-RAY EXAM OF SHOULDER: CPT | Mod: TC,RT

## 2025-08-18 PROCEDURE — 73030 X-RAY EXAM OF SHOULDER: CPT | Mod: 26,RT,, | Performed by: STUDENT IN AN ORGANIZED HEALTH CARE EDUCATION/TRAINING PROGRAM

## 2025-08-18 RX ORDER — BETAMETHASONE SODIUM PHOSPHATE AND BETAMETHASONE ACETATE 3; 3 MG/ML; MG/ML
6 INJECTION, SUSPENSION INTRA-ARTICULAR; INTRALESIONAL; INTRAMUSCULAR; SOFT TISSUE
Status: DISCONTINUED | OUTPATIENT
Start: 2025-08-18 | End: 2025-08-18 | Stop reason: HOSPADM

## 2025-08-18 RX ORDER — DICLOFENAC SODIUM 75 MG/1
75 TABLET, DELAYED RELEASE ORAL NIGHTLY PRN
Qty: 31 TABLET | Refills: 1 | Status: SHIPPED | OUTPATIENT
Start: 2025-08-18

## 2025-08-18 RX ADMIN — BETAMETHASONE SODIUM PHOSPHATE AND BETAMETHASONE ACETATE 6 MG: 3; 3 INJECTION, SUSPENSION INTRA-ARTICULAR; INTRALESIONAL; INTRAMUSCULAR; SOFT TISSUE at 03:08

## 2025-08-29 ENCOUNTER — HOSPITAL ENCOUNTER (OUTPATIENT)
Dept: RADIOLOGY | Facility: HOSPITAL | Age: 50
Discharge: HOME | End: 2025-08-29
Attending: FAMILY MEDICINE
Payer: COMMERCIAL

## 2025-08-29 ENCOUNTER — OFFICE VISIT (OUTPATIENT)
Dept: INTERNAL MEDICINE | Facility: CLINIC | Age: 50
End: 2025-08-29
Payer: COMMERCIAL

## 2025-08-29 VITALS
WEIGHT: 209.88 LBS | BODY MASS INDEX: 29.38 KG/M2 | HEART RATE: 87 BPM | HEIGHT: 71 IN | RESPIRATION RATE: 18 BRPM | DIASTOLIC BLOOD PRESSURE: 80 MMHG | SYSTOLIC BLOOD PRESSURE: 128 MMHG | OXYGEN SATURATION: 98 %

## 2025-08-29 DIAGNOSIS — M47.26 OSTEOARTHRITIS OF SPINE WITH RADICULOPATHY, LUMBAR REGION: ICD-10-CM

## 2025-08-29 DIAGNOSIS — G89.29 CHRONIC RIGHT HIP PAIN: Primary | ICD-10-CM

## 2025-08-29 DIAGNOSIS — M25.551 CHRONIC RIGHT HIP PAIN: Primary | ICD-10-CM

## 2025-08-29 PROCEDURE — 99999 PR PBB SHADOW E&M-EST. PATIENT-LVL III: CPT | Mod: PBBFAC,,, | Performed by: FAMILY MEDICINE

## 2025-08-29 RX ORDER — CYCLOBENZAPRINE HCL 10 MG
10 TABLET ORAL NIGHTLY
COMMUNITY
Start: 2025-08-24

## 2025-08-29 RX ORDER — MELOXICAM 15 MG/1
15 TABLET ORAL DAILY PRN
Qty: 30 TABLET | Refills: 0 | Status: SHIPPED | OUTPATIENT
Start: 2025-08-29